# Patient Record
Sex: FEMALE | URBAN - METROPOLITAN AREA
[De-identification: names, ages, dates, MRNs, and addresses within clinical notes are randomized per-mention and may not be internally consistent; named-entity substitution may affect disease eponyms.]

---

## 2017-04-12 ENCOUNTER — APPOINTMENT (OUTPATIENT)
Age: 52
Setting detail: DERMATOLOGY
End: 2017-04-12

## 2017-04-12 DIAGNOSIS — Z41.9 ENCOUNTER FOR PROCEDURE FOR PURPOSES OTHER THAN REMEDYING HEALTH STATE, UNSPECIFIED: ICD-10-CM

## 2017-04-12 PROCEDURE — ? CHEMICAL PEEL

## 2017-04-12 ASSESSMENT — LOCATION SIMPLE DESCRIPTION DERM: LOCATION SIMPLE: LEFT CHEEK

## 2017-04-12 ASSESSMENT — LOCATION ZONE DERM: LOCATION ZONE: FACE

## 2017-04-12 ASSESSMENT — LOCATION DETAILED DESCRIPTION DERM: LOCATION DETAILED: LEFT CENTRAL MALAR CHEEK

## 2017-04-12 NOTE — PROCEDURE: CHEMICAL PEEL
Price (Use Numbers Only, No Special Characters Or $): 225.00
Prep: Pre-peel cleanser was applied to the treatment area.
Chemical Peel: Skin Medica Rejuvenize
Consent: Prior to the procedure, verbal consent was obtained and risks were reviewed, including but not limited to: redness, peeling, irritation.
Post Peel Care: Cleanse with mild wash, moisturizer, as needed and use spf 30 or higher. Patient was instructed to avoid use of acne medications for two days after treatment, and for four days prior to any future chemical peel service.
Detail Level: Zone
Post-Care Instructions: I reviewed with the patient in detail post-care instructions. Patient should avoid sun exposure and wear sun protection.

## 2017-04-24 ENCOUNTER — APPOINTMENT (OUTPATIENT)
Age: 52
Setting detail: DERMATOLOGY
End: 2017-04-24

## 2017-04-24 DIAGNOSIS — Z41.9 ENCOUNTER FOR PROCEDURE FOR PURPOSES OTHER THAN REMEDYING HEALTH STATE, UNSPECIFIED: ICD-10-CM

## 2017-04-24 PROCEDURE — ? COSMETIC FOLLOW-UP

## 2017-04-24 NOTE — PROCEDURE: COSMETIC FOLLOW-UP
Improvement Override (Free Text): None
Patient Satisfaction: Very dissatisfied
Side Effects Or Complications: Under correction
Detail Level: Zone

## 2017-04-26 ENCOUNTER — APPOINTMENT (OUTPATIENT)
Age: 52
Setting detail: DERMATOLOGY
End: 2017-04-26

## 2017-04-26 DIAGNOSIS — Z41.9 ENCOUNTER FOR PROCEDURE FOR PURPOSES OTHER THAN REMEDYING HEALTH STATE, UNSPECIFIED: ICD-10-CM

## 2017-04-26 PROCEDURE — ? CHEMICAL PEEL

## 2017-04-26 ASSESSMENT — LOCATION DETAILED DESCRIPTION DERM: LOCATION DETAILED: LEFT INFERIOR MEDIAL MALAR CHEEK

## 2017-04-26 ASSESSMENT — LOCATION ZONE DERM: LOCATION ZONE: FACE

## 2017-04-26 ASSESSMENT — LOCATION SIMPLE DESCRIPTION DERM: LOCATION SIMPLE: LEFT CHEEK

## 2017-04-26 NOTE — PROCEDURE: CHEMICAL PEEL
Price (Use Numbers Only, No Special Characters Or $): 0.00
Prep: Pre-peel cleanser was applied to the treatment area.
Post-Care Instructions: I reviewed with the patient in detail post-care instructions. Patient should avoid sun exposure and wear sun protection.
Post Peel Care: Cleanse with mild cleanser in am.
Detail Level: Zone
Chemical Peel: Skin Medica Vitalize
Consent: Prior to the procedure, verbal consent was obtained and risks were reviewed, including but not limited to: redness, peeling, irritation.

## 2017-06-06 ENCOUNTER — APPOINTMENT (OUTPATIENT)
Age: 52
Setting detail: DERMATOLOGY
End: 2017-06-06

## 2017-06-06 DIAGNOSIS — L70.0 ACNE VULGARIS: ICD-10-CM

## 2017-06-06 PROCEDURE — 10040 EXTRACTION: CPT

## 2017-06-06 PROCEDURE — ? ACNE SURGERY

## 2017-06-06 ASSESSMENT — LOCATION SIMPLE DESCRIPTION DERM: LOCATION SIMPLE: LEFT CHEEK

## 2017-06-06 ASSESSMENT — LOCATION ZONE DERM: LOCATION ZONE: FACE

## 2017-06-06 ASSESSMENT — LOCATION DETAILED DESCRIPTION DERM: LOCATION DETAILED: LEFT INFERIOR MEDIAL MALAR CHEEK

## 2017-06-06 NOTE — PROCEDURE: ACNE SURGERY
Extraction Method: comedo extractor
Prep Text (Optional): Prior to removal the treatment areas were prepped in the usual fashion.
Consent was obtained and risks were reviewed.
Render Number Of Lesions Treated: no
Post-Care Instructions: I reviewed with the patient  post-care instructions.
Detail Level: Zone
Acne Type: Comedonal Lesions
Render Post-Care Instructions In Note?: yes

## 2017-06-20 ENCOUNTER — APPOINTMENT (OUTPATIENT)
Age: 52
Setting detail: DERMATOLOGY
End: 2017-06-20

## 2017-06-20 DIAGNOSIS — L70.0 ACNE VULGARIS: ICD-10-CM

## 2017-06-20 PROCEDURE — 10040 EXTRACTION: CPT

## 2017-06-20 PROCEDURE — ? ACNE SURGERY

## 2017-06-20 ASSESSMENT — LOCATION ZONE DERM: LOCATION ZONE: FACE

## 2017-06-20 ASSESSMENT — LOCATION DETAILED DESCRIPTION DERM: LOCATION DETAILED: LEFT INFERIOR CENTRAL MALAR CHEEK

## 2017-06-20 ASSESSMENT — LOCATION SIMPLE DESCRIPTION DERM: LOCATION SIMPLE: LEFT CHEEK

## 2017-06-20 NOTE — PROCEDURE: ACNE SURGERY
Acne Type: Comedonal Lesions
Detail Level: Zone
Consent was obtained and risks were reviewed.
Post-Care Instructions: I reviewed with the patient  post-care instructions.
Render Number Of Lesions Treated: no
Prep Text (Optional): Prior to removal the treatment areas were prepped in the usual fashion.
Render Post-Care Instructions In Note?: yes
Extraction Method: comedo extractor

## 2017-07-10 ENCOUNTER — APPOINTMENT (OUTPATIENT)
Age: 52
Setting detail: DERMATOLOGY
End: 2017-07-10

## 2017-07-10 DIAGNOSIS — L70.0 ACNE VULGARIS: ICD-10-CM

## 2017-07-10 PROCEDURE — ? ACNE SURGERY

## 2017-07-10 PROCEDURE — 10040 EXTRACTION: CPT

## 2017-07-10 ASSESSMENT — LOCATION DETAILED DESCRIPTION DERM: LOCATION DETAILED: LEFT INFERIOR CENTRAL MALAR CHEEK

## 2017-07-10 ASSESSMENT — LOCATION ZONE DERM: LOCATION ZONE: FACE

## 2017-07-10 ASSESSMENT — LOCATION SIMPLE DESCRIPTION DERM: LOCATION SIMPLE: LEFT CHEEK

## 2017-07-10 NOTE — PROCEDURE: ACNE SURGERY
Prep Text (Optional): Prior to removal the treatment areas were prepped in the usual fashion.
Consent was obtained and risks were reviewed.
Extraction Method: comedo extractor
Post-Care Instructions: I reviewed with the patient  post-care instructions.
Detail Level: Zone
Render Post-Care Instructions In Note?: yes
Render Number Of Lesions Treated: no
Acne Type: Comedonal Lesions

## 2017-07-24 ENCOUNTER — APPOINTMENT (OUTPATIENT)
Age: 52
Setting detail: DERMATOLOGY
End: 2017-07-24

## 2017-07-24 DIAGNOSIS — L70.0 ACNE VULGARIS: ICD-10-CM

## 2017-07-24 PROCEDURE — 10040 EXTRACTION: CPT

## 2017-07-24 PROCEDURE — ? ACNE SURGERY

## 2017-07-24 ASSESSMENT — LOCATION SIMPLE DESCRIPTION DERM: LOCATION SIMPLE: LEFT CHEEK

## 2017-07-24 ASSESSMENT — LOCATION DETAILED DESCRIPTION DERM: LOCATION DETAILED: LEFT INFERIOR MEDIAL MALAR CHEEK

## 2017-07-24 ASSESSMENT — LOCATION ZONE DERM: LOCATION ZONE: FACE

## 2017-07-24 NOTE — PROCEDURE: ACNE SURGERY
Acne Type: Comedonal Lesions
Consent was obtained and risks were reviewed.
Detail Level: Zone
Prep Text (Optional): Prior to removal the treatment areas were prepped in the usual fashion.
Render Number Of Lesions Treated: no
Post-Care Instructions: I reviewed with the patient  post-care instructions.
Render Post-Care Instructions In Note?: yes
Extraction Method: comedo extractor

## 2017-09-11 ENCOUNTER — APPOINTMENT (OUTPATIENT)
Age: 52
Setting detail: DERMATOLOGY
End: 2017-09-11

## 2017-09-11 DIAGNOSIS — L70.0 ACNE VULGARIS: ICD-10-CM

## 2017-09-11 PROCEDURE — ? ACNE SURGERY

## 2017-09-11 PROCEDURE — 10040 EXTRACTION: CPT

## 2017-09-11 ASSESSMENT — LOCATION SIMPLE DESCRIPTION DERM: LOCATION SIMPLE: LEFT CHEEK

## 2017-09-11 ASSESSMENT — LOCATION ZONE DERM: LOCATION ZONE: FACE

## 2017-09-11 ASSESSMENT — LOCATION DETAILED DESCRIPTION DERM: LOCATION DETAILED: LEFT INFERIOR MEDIAL MALAR CHEEK

## 2017-09-11 NOTE — PROCEDURE: ACNE SURGERY
Detail Level: Zone
Acne Type: Comedonal Lesions
Render Post-Care Instructions In Note?: yes
Render Number Of Lesions Treated: no
Post-Care Instructions: I reviewed with the patient  post-care instructions.
Prep Text (Optional): Prior to removal the treatment areas were prepped in the usual fashion.
Extraction Method: comedo extractor
Consent was obtained and risks were reviewed.

## 2017-10-31 ENCOUNTER — APPOINTMENT (OUTPATIENT)
Age: 52
Setting detail: DERMATOLOGY
End: 2017-10-31

## 2017-10-31 DIAGNOSIS — L70.0 ACNE VULGARIS: ICD-10-CM

## 2017-10-31 PROBLEM — E13.9 OTHER SPECIFIED DIABETES MELLITUS WITHOUT COMPLICATIONS: Status: ACTIVE | Noted: 2017-10-31

## 2017-10-31 PROCEDURE — ? ACNE SURGERY

## 2017-10-31 PROCEDURE — 10040 EXTRACTION: CPT

## 2017-10-31 ASSESSMENT — LOCATION DETAILED DESCRIPTION DERM: LOCATION DETAILED: LEFT INFERIOR CENTRAL MALAR CHEEK

## 2017-10-31 ASSESSMENT — LOCATION ZONE DERM: LOCATION ZONE: FACE

## 2017-10-31 ASSESSMENT — LOCATION SIMPLE DESCRIPTION DERM: LOCATION SIMPLE: LEFT CHEEK

## 2017-10-31 NOTE — PROCEDURE: ACNE SURGERY
Render Number Of Lesions Treated: no
Consent was obtained and risks were reviewed.
Detail Level: Zone
Post-Care Instructions: I reviewed with the patient  post-care instructions.
Extraction Method: comedo extractor
Acne Type: Comedonal Lesions
Prep Text (Optional): Prior to removal the treatment areas were prepped in the usual fashion.
Render Post-Care Instructions In Note?: yes

## 2017-11-13 ENCOUNTER — APPOINTMENT (OUTPATIENT)
Age: 52
Setting detail: DERMATOLOGY
End: 2017-11-13

## 2017-11-13 DIAGNOSIS — L70.0 ACNE VULGARIS: ICD-10-CM

## 2017-11-13 PROCEDURE — ? ACNE SURGERY

## 2017-11-13 PROCEDURE — 10040 EXTRACTION: CPT

## 2017-11-13 ASSESSMENT — LOCATION DETAILED DESCRIPTION DERM: LOCATION DETAILED: LEFT INFERIOR CENTRAL MALAR CHEEK

## 2017-11-13 ASSESSMENT — LOCATION ZONE DERM: LOCATION ZONE: FACE

## 2017-11-13 ASSESSMENT — LOCATION SIMPLE DESCRIPTION DERM: LOCATION SIMPLE: LEFT CHEEK

## 2017-11-13 NOTE — PROCEDURE: ACNE SURGERY
Extraction Method: comedo extractor
Consent was obtained and risks were reviewed.
Post-Care Instructions: I reviewed with the patient  post-care instructions.
Prep Text (Optional): Prior to removal the treatment areas were prepped in the usual fashion.
Acne Type: Comedonal Lesions
Render Number Of Lesions Treated: no
Render Post-Care Instructions In Note?: yes
Detail Level: Zone

## 2017-11-27 ENCOUNTER — APPOINTMENT (OUTPATIENT)
Age: 52
Setting detail: DERMATOLOGY
End: 2017-11-27

## 2017-11-27 DIAGNOSIS — L70.0 ACNE VULGARIS: ICD-10-CM

## 2017-11-27 PROCEDURE — ? ACNE SURGERY

## 2017-11-27 PROCEDURE — 10040 EXTRACTION: CPT

## 2017-11-27 ASSESSMENT — LOCATION SIMPLE DESCRIPTION DERM: LOCATION SIMPLE: LEFT CHEEK

## 2017-11-27 ASSESSMENT — LOCATION ZONE DERM: LOCATION ZONE: FACE

## 2017-11-27 ASSESSMENT — LOCATION DETAILED DESCRIPTION DERM: LOCATION DETAILED: LEFT INFERIOR CENTRAL MALAR CHEEK

## 2017-11-27 NOTE — PROCEDURE: ACNE SURGERY
Post-Care Instructions: I reviewed with the patient  post-care instructions.
Render Post-Care Instructions In Note?: yes
Render Number Of Lesions Treated: no
Acne Type: Comedonal Lesions
Prep Text (Optional): Prior to removal the treatment areas were prepped in the usual fashion.
Detail Level: Zone
Extraction Method: comedo extractor
Consent was obtained and risks were reviewed.

## 2017-12-12 ENCOUNTER — APPOINTMENT (OUTPATIENT)
Age: 52
Setting detail: DERMATOLOGY
End: 2017-12-12

## 2018-01-09 ENCOUNTER — APPOINTMENT (OUTPATIENT)
Age: 53
Setting detail: DERMATOLOGY
End: 2018-01-09

## 2018-01-09 DIAGNOSIS — L70.0 ACNE VULGARIS: ICD-10-CM

## 2018-01-09 PROCEDURE — ? ACNE SURGERY

## 2018-01-09 PROCEDURE — 10040 EXTRACTION: CPT

## 2018-01-09 ASSESSMENT — LOCATION SIMPLE DESCRIPTION DERM: LOCATION SIMPLE: LEFT CHEEK

## 2018-01-09 ASSESSMENT — LOCATION ZONE DERM: LOCATION ZONE: FACE

## 2018-01-09 ASSESSMENT — LOCATION DETAILED DESCRIPTION DERM: LOCATION DETAILED: LEFT INFERIOR MEDIAL MALAR CHEEK

## 2018-01-09 NOTE — PROCEDURE: ACNE SURGERY
Post-Care Instructions: I reviewed with the patient  post-care instructions.
Render Post-Care Instructions In Note?: yes
Consent was obtained and risks were reviewed.
Acne Type: Comedonal Lesions
Render Number Of Lesions Treated: no
Detail Level: Zone
Prep Text (Optional): Prior to removal the treatment areas were prepped in the usual fashion.
Extraction Method: comedo extractor

## 2018-02-13 ENCOUNTER — APPOINTMENT (OUTPATIENT)
Age: 53
Setting detail: DERMATOLOGY
End: 2018-02-13

## 2018-02-13 DIAGNOSIS — L259 CONTACT DERMATITIS AND OTHER ECZEMA, UNSPECIFIED CAUSE: ICD-10-CM

## 2018-02-13 DIAGNOSIS — D17 BENIGN LIPOMATOUS NEOPLASM: ICD-10-CM

## 2018-02-13 DIAGNOSIS — L72.8 OTHER FOLLICULAR CYSTS OF THE SKIN AND SUBCUTANEOUS TISSUE: ICD-10-CM

## 2018-02-13 DIAGNOSIS — L82.1 OTHER SEBORRHEIC KERATOSIS: ICD-10-CM

## 2018-02-13 DIAGNOSIS — D18.0 HEMANGIOMA: ICD-10-CM

## 2018-02-13 DIAGNOSIS — I83.9 ASYMPTOMATIC VARICOSE VEINS OF LOWER EXTREMITIES: ICD-10-CM

## 2018-02-13 DIAGNOSIS — L81.4 OTHER MELANIN HYPERPIGMENTATION: ICD-10-CM

## 2018-02-13 DIAGNOSIS — D22 MELANOCYTIC NEVI: ICD-10-CM

## 2018-02-13 DIAGNOSIS — L70.0 ACNE VULGARIS: ICD-10-CM

## 2018-02-13 DIAGNOSIS — L57.0 ACTINIC KERATOSIS: ICD-10-CM

## 2018-02-13 PROBLEM — I83.93 ASYMPTOMATIC VARICOSE VEINS OF BILATERAL LOWER EXTREMITIES: Status: ACTIVE | Noted: 2018-02-13

## 2018-02-13 PROBLEM — D18.01 HEMANGIOMA OF SKIN AND SUBCUTANEOUS TISSUE: Status: ACTIVE | Noted: 2018-02-13

## 2018-02-13 PROBLEM — L23.9 ALLERGIC CONTACT DERMATITIS, UNSPECIFIED CAUSE: Status: ACTIVE | Noted: 2018-02-13

## 2018-02-13 PROBLEM — F41.9 ANXIETY DISORDER, UNSPECIFIED: Status: ACTIVE | Noted: 2018-02-13

## 2018-02-13 PROBLEM — D22.5 MELANOCYTIC NEVI OF TRUNK: Status: ACTIVE | Noted: 2018-02-13

## 2018-02-13 PROBLEM — D17.1 BENIGN LIPOMATOUS NEOPLASM OF SKIN AND SUBCUTANEOUS TISSUE OF TRUNK: Status: ACTIVE | Noted: 2018-02-13

## 2018-02-13 PROCEDURE — 17000 DESTRUCT PREMALG LESION: CPT

## 2018-02-13 PROCEDURE — ? ADDITIONAL NOTES

## 2018-02-13 PROCEDURE — 99214 OFFICE O/P EST MOD 30 MIN: CPT | Mod: 25

## 2018-02-13 PROCEDURE — ? PRESCRIPTION

## 2018-02-13 PROCEDURE — ? LIQUID NITROGEN

## 2018-02-13 PROCEDURE — ? OBSERVATION AND MEASURE

## 2018-02-13 PROCEDURE — ? COUNSELING

## 2018-02-13 RX ORDER — DESONIDE 0.5 MG/G
OINTMENT TOPICAL
Qty: 1 | Refills: 0 | Status: ERX | COMMUNITY
Start: 2018-02-13

## 2018-02-13 RX ADMIN — DESONIDE: 0.5 OINTMENT TOPICAL at 21:44

## 2018-02-13 ASSESSMENT — LOCATION DETAILED DESCRIPTION DERM
LOCATION DETAILED: RIGHT LATERAL SUPERIOR CHEST
LOCATION DETAILED: RIGHT MEDIAL SUPERIOR EYELID
LOCATION DETAILED: RIGHT SUPERIOR UPPER BACK
LOCATION DETAILED: RIGHT DISTAL PRETIBIAL REGION
LOCATION DETAILED: LEFT INFERIOR CENTRAL MALAR CHEEK
LOCATION DETAILED: LEFT SUPERIOR MEDIAL MIDBACK
LOCATION DETAILED: LEFT DISTAL PRETIBIAL REGION
LOCATION DETAILED: RIGHT RIB CAGE
LOCATION DETAILED: NASAL ROOT
LOCATION DETAILED: LEFT LATERAL SUPERIOR EYELID
LOCATION DETAILED: RIGHT MID-UPPER BACK

## 2018-02-13 ASSESSMENT — LOCATION SIMPLE DESCRIPTION DERM
LOCATION SIMPLE: LEFT SUPERIOR EYELID
LOCATION SIMPLE: NOSE
LOCATION SIMPLE: LEFT PRETIBIAL REGION
LOCATION SIMPLE: LEFT LOWER BACK
LOCATION SIMPLE: ABDOMEN
LOCATION SIMPLE: RIGHT PRETIBIAL REGION
LOCATION SIMPLE: RIGHT UPPER BACK
LOCATION SIMPLE: LEFT CHEEK
LOCATION SIMPLE: CHEST
LOCATION SIMPLE: RIGHT SUPERIOR EYELID

## 2018-02-13 ASSESSMENT — LOCATION ZONE DERM
LOCATION ZONE: LEG
LOCATION ZONE: EYELID
LOCATION ZONE: FACE
LOCATION ZONE: NOSE
LOCATION ZONE: TRUNK

## 2018-02-13 NOTE — PROCEDURE: LIQUID NITROGEN
Post-Care Instructions: I reviewed with the patient in detail post-care instructions. Patient is to wear sunprotection, and avoid picking at any of the treated lesions. Pt may apply Vaseline to crusted or scabbing areas.
Number Of Freeze-Thaw Cycles: 1 freeze-thaw cycle
Duration Of Freeze Thaw-Cycle (Seconds): 0
Render Post-Care Instructions In Note?: yes
Detail Level: Detailed
Consent: The patient's consent was obtained including but not limited to risks of crusting, scabbing, blistering, scarring, darker or lighter pigmentary change, recurrence, incomplete removal and infection.

## 2018-02-13 NOTE — PROCEDURE: COUNSELING
Doxycycline Counseling:  Patient counseled regarding possible photosensitivity and increased risk for sunburn.  Patient instructed to avoid sunlight, if possible.  When exposed to sunlight, patients should wear protective clothing, sunglasses, and sunscreen.  The patient was instructed to call the office immediately if the following severe adverse effects occur:  hearing changes, easy bruising/bleeding, severe headache, or vision changes.  The patient verbalized understanding of the proper use and possible adverse effects of doxycycline.  All of the patient's questions and concerns were addressed.
Detail Level: Generalized
Topical Clindamycin Counseling: Patient counseled that this medication may cause skin irritation or allergic reactions.  In the event of skin irritation, the patient was advised to reduce the amount of the drug applied or use it less frequently.   The patient verbalized understanding of the proper use and possible adverse effects of clindamycin.  All of the patient's questions and concerns were addressed.
Azithromycin Counseling:  I discussed with the patient the risks of azithromycin including but not limited to GI upset, allergic reaction, drug rash, diarrhea, and yeast infections.
Azithromycin Pregnancy And Lactation Text: This medication is considered safe during pregnancy and is also secreted in breast milk.
Topical Sulfur Applications Pregnancy And Lactation Text: This medication is Pregnancy Category C and has an unknown safety profile during pregnancy. It is unknown if this topical medication is excreted in breast milk.
Birth Control Pills Pregnancy And Lactation Text: This medication should be avoided if pregnant and for the first 30 days post-partum.
Detail Level: Simple
Tetracycline Counseling: Patient counseled regarding possible photosensitivity and increased risk for sunburn.  Patient instructed to avoid sunlight, if possible.  When exposed to sunlight, patients should wear protective clothing, sunglasses, and sunscreen.  The patient was instructed to call the office immediately if the following severe adverse effects occur:  hearing changes, easy bruising/bleeding, severe headache, or vision changes.  The patient verbalized understanding of the proper use and possible adverse effects of tetracycline.  All of the patient's questions and concerns were addressed. Patient understands to avoid pregnancy while on therapy due to potential birth defects.
Birth Control Pills Counseling: Birth Control Pill Counseling: I discussed with the patient the potential side effects of OCPs including but not limited to increased risk of stroke, heart attack, thrombophlebitis, deep venous thrombosis, hepatic adenomas, breast changes, GI upset, headaches, and depression.  The patient verbalized understanding of the proper use and possible adverse effects of OCPs. All of the patient's questions and concerns were addressed.
Detail Level: Zone
Tazorac Counseling:  Patient advised that medication is irritating and drying.  Patient may need to apply sparingly and wash off after an hour before eventually leaving it on overnight.  The patient verbalized understanding of the proper use and possible adverse effects of tazorac.  All of the patient's questions and concerns were addressed.
Topical Retinoid counseling:  Patient advised to apply a pea-sized amount only at bedtime and wait 30 minutes after washing their face before applying.  If too drying, patient may add a non-comedogenic moisturizer. The patient verbalized understanding of the proper use and possible adverse effects of retinoids.  All of the patient's questions and concerns were addressed.
Spironolactone Counseling: Patient advised regarding risks of diarrhea, abdominal pain, hyperkalemia, birth defects (for female patients), liver toxicity and renal toxicity. The patient may need blood work to monitor liver and kidney function and potassium levels while on therapy. The patient verbalized understanding of the proper use and possible adverse effects of spironolactone.  All of the patient's questions and concerns were addressed.
Isotretinoin Pregnancy And Lactation Text: This medication is Pregnancy Category X and is considered extremely dangerous during pregnancy. It is unknown if it is excreted in breast milk.
Isotretinoin Counseling: Patient should get monthly blood tests, not donate blood, not drive at night if vision affected, not share medication, and not undergo elective surgery for 6 months after tx completed. Side effects reviewed, pt to contact office should one occur.
Tetracycline Pregnancy And Lactation Text: This medication is Pregnancy Category D and not consider safe during pregnancy. It is also excreted in breast milk.
Topical Retinoid Pregnancy And Lactation Text: This medication is Pregnancy Category C. It is unknown if this medication is excreted in breast milk.
Include Pregnancy/Lactation Warning?: No
Bactrim Pregnancy And Lactation Text: This medication is Pregnancy Category D and is known to cause fetal risk.  It is also excreted in breast milk.
Erythromycin Counseling:  I discussed with the patient the risks of erythromycin including but not limited to GI upset, allergic reaction, drug rash, diarrhea, increase in liver enzymes, and yeast infections.
Benzoyl Peroxide Counseling: Patient counseled that medicine may cause skin irritation and bleach clothing.  In the event of skin irritation, the patient was advised to reduce the amount of the drug applied or use it less frequently.   The patient verbalized understanding of the proper use and possible adverse effects of benzoyl peroxide.  All of the patient's questions and concerns were addressed.
Dapsone Counseling: I discussed with the patient the risks of dapsone including but not limited to hemolytic anemia, agranulocytosis, rashes, methemoglobinemia, kidney failure, peripheral neuropathy, headaches, GI upset, and liver toxicity.  Patients who start dapsone require monitoring including baseline LFTs and weekly CBCs for the first month, then every month thereafter.  The patient verbalized understanding of the proper use and possible adverse effects of dapsone.  All of the patient's questions and concerns were addressed.
Minocycline Counseling: Patient advised regarding possible photosensitivity and discoloration of the teeth, skin, lips, tongue and gums.  Patient instructed to avoid sunlight, if possible.  When exposed to sunlight, patients should wear protective clothing, sunglasses, and sunscreen.  The patient was instructed to call the office immediately if the following severe adverse effects occur:  hearing changes, easy bruising/bleeding, severe headache, or vision changes.  The patient verbalized understanding of the proper use and possible adverse effects of minocycline.  All of the patient's questions and concerns were addressed.
Spironolactone Pregnancy And Lactation Text: This medication can cause feminization of the male fetus and should be avoided during pregnancy. The active metabolite is also found in breast milk.
Tazorac Pregnancy And Lactation Text: This medication is not safe during pregnancy. It is unknown if this medication is excreted in breast milk.
High Dose Vitamin A Pregnancy And Lactation Text: High dose vitamin A therapy is contraindicated during pregnancy and breast feeding.
Topical Sulfur Applications Counseling: Topical Sulfur Counseling: Patient counseled that this medication may cause skin irritation or allergic reactions.  In the event of skin irritation, the patient was advised to reduce the amount of the drug applied or use it less frequently.   The patient verbalized understanding of the proper use and possible adverse effects of topical sulfur application.  All of the patient's questions and concerns were addressed.
Erythromycin Pregnancy And Lactation Text: This medication is Pregnancy Category B and is considered safe during pregnancy. It is also excreted in breast milk.
Detail Level: Detailed
Topical Clindamycin Pregnancy And Lactation Text: This medication is Pregnancy Category B and is considered safe during pregnancy. It is unknown if it is excreted in breast milk.
Dapsone Pregnancy And Lactation Text: This medication is Pregnancy Category C and is not considered safe during pregnancy or breast feeding.
Benzoyl Peroxide Pregnancy And Lactation Text: This medication is Pregnancy Category C. It is unknown if benzoyl peroxide is excreted in breast milk.
Doxycycline Pregnancy And Lactation Text: This medication is Pregnancy Category D and not consider safe during pregnancy. It is also excreted in breast milk but is considered safe for shorter treatment courses.
Bactrim Counseling:  I discussed with the patient the risks of sulfa antibiotics including but not limited to GI upset, allergic reaction, drug rash, diarrhea, dizziness, photosensitivity, and yeast infections.  Rarely, more serious reactions can occur including but not limited to aplastic anemia, agranulocytosis, methemoglobinemia, blood dyscrasias, liver or kidney failure, lung infiltrates or desquamative/blistering drug rashes.
High Dose Vitamin A Counseling: Side effects reviewed, pt to contact office should one occur.

## 2018-02-13 NOTE — PROCEDURE: ADDITIONAL NOTES
Additional Notes: - Will start Desonide Ointment once to twice daily for 5 days\\n- Patient does have/wear acrylic nails and nail polish \\n- If not better in a few months will consider patch testing
Additional Notes: Can schedule appointment with Kym Gant for Sclerotherapy

## 2018-02-19 ENCOUNTER — APPOINTMENT (OUTPATIENT)
Age: 53
Setting detail: DERMATOLOGY
End: 2018-02-19

## 2018-02-19 DIAGNOSIS — L70.0 ACNE VULGARIS: ICD-10-CM

## 2018-02-19 PROCEDURE — ? ACNE SURGERY

## 2018-02-19 PROCEDURE — 10040 EXTRACTION: CPT | Mod: 79

## 2018-02-19 ASSESSMENT — LOCATION SIMPLE DESCRIPTION DERM: LOCATION SIMPLE: LEFT CHEEK

## 2018-02-19 ASSESSMENT — LOCATION DETAILED DESCRIPTION DERM: LOCATION DETAILED: LEFT INFERIOR MEDIAL MALAR CHEEK

## 2018-02-19 ASSESSMENT — LOCATION ZONE DERM: LOCATION ZONE: FACE

## 2018-02-19 NOTE — PROCEDURE: ACNE SURGERY
Render Number Of Lesions Treated: no
Detail Level: Zone
Post-Care Instructions: I reviewed with the patient  post-care instructions.
Render Post-Care Instructions In Note?: yes
Prep Text (Optional): Prior to removal the treatment areas were prepped in the usual fashion.
Consent was obtained and risks were reviewed.
Extraction Method: comedo extractor
Acne Type: Comedonal Lesions

## 2018-03-27 ENCOUNTER — APPOINTMENT (OUTPATIENT)
Age: 53
Setting detail: DERMATOLOGY
End: 2018-03-27

## 2018-03-27 DIAGNOSIS — L259 CONTACT DERMATITIS AND OTHER ECZEMA, UNSPECIFIED CAUSE: ICD-10-CM

## 2018-03-27 PROBLEM — L23.9 ALLERGIC CONTACT DERMATITIS, UNSPECIFIED CAUSE: Status: ACTIVE | Noted: 2018-03-27

## 2018-03-27 PROCEDURE — ? ADDITIONAL NOTES

## 2018-03-27 PROCEDURE — ? OTHER

## 2018-03-27 PROCEDURE — ? COUNSELING

## 2018-03-27 PROCEDURE — 99213 OFFICE O/P EST LOW 20 MIN: CPT

## 2018-03-27 ASSESSMENT — LOCATION ZONE DERM: LOCATION ZONE: EYELID

## 2018-03-27 ASSESSMENT — LOCATION DETAILED DESCRIPTION DERM
LOCATION DETAILED: LEFT LATERAL SUPERIOR EYELID
LOCATION DETAILED: RIGHT MEDIAL SUPERIOR EYELID

## 2018-03-27 ASSESSMENT — LOCATION SIMPLE DESCRIPTION DERM
LOCATION SIMPLE: LEFT SUPERIOR EYELID
LOCATION SIMPLE: RIGHT SUPERIOR EYELID

## 2018-03-27 NOTE — PROCEDURE: ADDITIONAL NOTES
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

## 2018-03-27 NOTE — PROCEDURE: OTHER
Detail Level: Zone
Other (Free Text): - PLAN ROBERT (84), COS (56), FRAG(46) (TOTAL 186)\\n\\n- Her children has history of seasonal allergies.\\n- Her mother has history of allergies, but not her father.\\n- Patient has no history of eczema or allergies.\\n- Discussed in detail patch testing procedures.\\n- Forms given to patient and patient filled out and returned all required forms prior to Patch testing appointment.\\n- Advised not to get back (were patches will be applied) wet for 5 days (the entire process).\\n- Advised can take sponge bathes to other areas of body but again DO NOT GET BACK WET.\\n- Advised NOT to take oral Antihistamines 1 week prior to Patch testing.\\n- Advised NOT to take oral steroids 1 week prior to Patch testing.
Note Text (......Xxx Chief Complaint.): This diagnosis correlates with the

## 2018-04-23 ENCOUNTER — APPOINTMENT (OUTPATIENT)
Age: 53
Setting detail: DERMATOLOGY
End: 2018-04-23

## 2018-04-23 DIAGNOSIS — L70.0 ACNE VULGARIS: ICD-10-CM

## 2018-04-23 PROCEDURE — ? ACNE SURGERY

## 2018-04-23 PROCEDURE — 10040 EXTRACTION: CPT

## 2018-04-23 ASSESSMENT — LOCATION SIMPLE DESCRIPTION DERM: LOCATION SIMPLE: LEFT CHEEK

## 2018-04-23 ASSESSMENT — LOCATION ZONE DERM: LOCATION ZONE: FACE

## 2018-04-23 ASSESSMENT — LOCATION DETAILED DESCRIPTION DERM: LOCATION DETAILED: LEFT INFERIOR CENTRAL MALAR CHEEK

## 2018-05-14 ENCOUNTER — APPOINTMENT (OUTPATIENT)
Age: 53
Setting detail: DERMATOLOGY
End: 2018-05-14

## 2018-05-14 DIAGNOSIS — L70.0 ACNE VULGARIS: ICD-10-CM

## 2018-05-14 PROCEDURE — 10040 EXTRACTION: CPT

## 2018-05-14 PROCEDURE — ? ACNE SURGERY

## 2018-05-14 ASSESSMENT — LOCATION DETAILED DESCRIPTION DERM: LOCATION DETAILED: LEFT INFERIOR CENTRAL MALAR CHEEK

## 2018-05-14 ASSESSMENT — LOCATION SIMPLE DESCRIPTION DERM: LOCATION SIMPLE: LEFT CHEEK

## 2018-05-14 ASSESSMENT — LOCATION ZONE DERM: LOCATION ZONE: FACE

## 2018-05-14 NOTE — PROCEDURE: ACNE SURGERY
Acne Type: Comedonal Lesions
Post-Care Instructions: I reviewed with the patient  post-care instructions.
Prep Text (Optional): Prior to removal the treatment areas were prepped in the usual fashion.
Render Post-Care Instructions In Note?: no
Detail Level: Zone
Consent was obtained and risks were reviewed.
Extraction Method: comedo extractor

## 2018-05-29 ENCOUNTER — APPOINTMENT (OUTPATIENT)
Age: 53
Setting detail: DERMATOLOGY
End: 2018-05-29

## 2018-05-29 DIAGNOSIS — L70.0 ACNE VULGARIS: ICD-10-CM

## 2018-05-29 DIAGNOSIS — Z41.9 ENCOUNTER FOR PROCEDURE FOR PURPOSES OTHER THAN REMEDYING HEALTH STATE, UNSPECIFIED: ICD-10-CM

## 2018-05-29 PROCEDURE — ? ACNE SURGERY

## 2018-05-29 PROCEDURE — ? MICRODERMABRASION

## 2018-05-29 PROCEDURE — 10040 EXTRACTION: CPT

## 2018-05-29 ASSESSMENT — LOCATION SIMPLE DESCRIPTION DERM
LOCATION SIMPLE: LEFT CHEEK
LOCATION SIMPLE: LEFT CHEEK

## 2018-05-29 ASSESSMENT — LOCATION DETAILED DESCRIPTION DERM
LOCATION DETAILED: LEFT INFERIOR CENTRAL MALAR CHEEK
LOCATION DETAILED: LEFT INFERIOR CENTRAL MALAR CHEEK

## 2018-05-29 ASSESSMENT — LOCATION ZONE DERM
LOCATION ZONE: FACE
LOCATION ZONE: FACE

## 2018-05-29 NOTE — PROCEDURE: ACNE SURGERY
Extraction Method: comedo extractor
Acne Type: Cysts
Detail Level: Zone
Consent was obtained and risks were reviewed.
Prep Text (Optional): Prior to removal the treatment areas were prepped in the usual fashion.
Render Number Of Lesions Treated: no
Post-Care Instructions: I reviewed with the patient  post-care instructions.

## 2018-05-29 NOTE — PROCEDURE: MICRODERMABRASION
Consent: Verbal consent obtained.
Detail Level: Zone
Post-Care Instructions: I reviewed with the patient in detail post-care instructions. Patient should stay away from the sun and wear sun protection until treated areas are fully healed.
Prep Text: The patients skin was cleaned and prepped. Patient is paying out of pocket for the service.
Endpoint: mild erythema
Indication: acne
Price (Use Numbers Only, No Special Characters Or $): 35.00
Number Of Passes: 1
Vacuum Pressure: 10
Wand: Medium

## 2018-07-02 ENCOUNTER — APPOINTMENT (OUTPATIENT)
Age: 53
Setting detail: DERMATOLOGY
End: 2018-07-02

## 2018-07-02 DIAGNOSIS — Z41.9 ENCOUNTER FOR PROCEDURE FOR PURPOSES OTHER THAN REMEDYING HEALTH STATE, UNSPECIFIED: ICD-10-CM

## 2018-07-02 DIAGNOSIS — L70.0 ACNE VULGARIS: ICD-10-CM

## 2018-07-02 PROCEDURE — ? ACNE SURGERY

## 2018-07-02 PROCEDURE — ? MICRODERMABRASION

## 2018-07-02 PROCEDURE — 10040 EXTRACTION: CPT

## 2018-07-02 ASSESSMENT — LOCATION ZONE DERM
LOCATION ZONE: FACE
LOCATION ZONE: FACE

## 2018-07-02 ASSESSMENT — LOCATION SIMPLE DESCRIPTION DERM
LOCATION SIMPLE: LEFT CHEEK
LOCATION SIMPLE: LEFT CHEEK

## 2018-07-02 NOTE — PROCEDURE: ACNE SURGERY
Consent was obtained and risks were reviewed.
Prep Text (Optional): Prior to removal the treatment areas were prepped in the usual fashion.
Render Number Of Lesions Treated: no
Detail Level: Zone
Post-Care Instructions: I reviewed with the patient  post-care instructions.
Extraction Method: comedo extractor
Acne Type: Comedonal Lesions

## 2018-07-02 NOTE — PROCEDURE: MICRODERMABRASION
Prep Text: The patients skin was cleaned and prepped. Patient is paying out of pocket for the service.
Number Of Passes: 1
Price (Use Numbers Only, No Special Characters Or $): 35.00
Indication: pigmentation abnormalities
Endpoint: mild erythema
Consent: Verbal consent obtained.
Vacuum Pressure: 10
Post-Care Instructions: I reviewed with the patient in detail post-care instructions. Patient should stay away from the sun and wear sun protection until treated areas are fully healed.
Detail Level: Zone
Wand: Medium

## 2018-07-30 ENCOUNTER — APPOINTMENT (OUTPATIENT)
Age: 53
Setting detail: DERMATOLOGY
End: 2018-07-30

## 2018-07-30 DIAGNOSIS — L259 CONTACT DERMATITIS AND OTHER ECZEMA, UNSPECIFIED CAUSE: ICD-10-CM

## 2018-07-30 PROBLEM — L23.9 ALLERGIC CONTACT DERMATITIS, UNSPECIFIED CAUSE: Status: ACTIVE | Noted: 2018-07-30

## 2018-07-30 PROCEDURE — ? ADDITIONAL NOTES

## 2018-07-30 PROCEDURE — 95044 PATCH/APPLICATION TESTS: CPT

## 2018-07-30 PROCEDURE — ? PATCH TESTING

## 2018-07-30 NOTE — HPI: TESTING (PATCH TESTING)
Has Your Rash Been Biopsied Before?: has not been biopsied previously
Have You Had Previous Patch Testing In The Past?: none
How Severe Is Your Rash At Its Worst?: moderate
Who Is Your Referring Doctor?: 
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

## 2018-07-30 NOTE — PROCEDURE: ADDITIONAL NOTES
Additional Notes: Patient informed to keep back dry, no antihistamines, no sports bras, no exercising, and no excessive sweating. Patient to do sponge baths.

## 2018-07-30 NOTE — PROCEDURE: PATCH TESTING
Number Of Patches (Maximum Allowable Per Dos By Cms Is 90): 84
Consent: Written consent obtained, risks reviewed including but not limited to rash, itching, allergic reaction, systemic rash, remote possiblity of anaphylaxis to allergen.
Post-Care Instructions: I reviewed with the patient in detail post-care instructions. Patient should not sweat, pick at, or get the patches wet for 48 hours.
Detail Level: Zone
Number Of Patches (Maximum Allowable Per Dos By Cms Is 90): 57
Number Of Patches (Maximum Allowable Per Dos By Cms Is 90): 46

## 2018-08-01 ENCOUNTER — APPOINTMENT (OUTPATIENT)
Age: 53
Setting detail: DERMATOLOGY
End: 2018-08-01

## 2018-08-01 DIAGNOSIS — L259 CONTACT DERMATITIS AND OTHER ECZEMA, UNSPECIFIED CAUSE: ICD-10-CM

## 2018-08-01 PROBLEM — L23.9 ALLERGIC CONTACT DERMATITIS, UNSPECIFIED CAUSE: Status: ACTIVE | Noted: 2018-08-01

## 2018-08-01 PROCEDURE — ? COSMETIC PATCH TEST READING

## 2018-08-01 PROCEDURE — ? FRAGRANCE PATCH TEST READING

## 2018-08-01 PROCEDURE — ? NORTH AMERICAN 80 PATCH TEST READING

## 2018-08-01 PROCEDURE — 99213 OFFICE O/P EST LOW 20 MIN: CPT

## 2018-08-01 NOTE — PROCEDURE: FRAGRANCE PATCH TEST READING
Allergen 7 Reaction: no reaction
Name Of Allergen 1: Cinnamal / (Cinnamic aldehyde)
Name Of Allergen 8: Hydroxycitronellal
Name Of Allergen 25: Hydroxyisohexyl 3-Cyclohexene Carboxaldehyde (Lyral)
Name Of Allergen 3: Amyl cinnamal / (Amylcinnamaldehyde)
What Reading Time Point?: 48 hour
Name Of Allergen 37: Tomas armstronga
Name Of Allergen 24: Santalum album oil / (Sandalwood oil (Dominican)
Name Of Allergen 26: Citral
Detail Level: Zone
Name Of Allergen 19: Cananga oil
Name Of Allergen 23: Dayan absolute
Show Negative Results In The Note?: Yes
Name Of Allergen 5: Isoeugenol
Name Of Allergen 33: Anise alcohol
Name Of Allergen 40: Linalool, synthetic
Name Of Allergen 2: Cinnamyl alcohol / (Cinnamic alcohol)
Name Of Allergen 39: D-Limonene
Name Of Allergen 34: Benzyl benzoate
Name Of Allergen 11: Methylanthranilate
Name Of Allergen 42: Maj[place]hole/ (2,2-dimethyl-3-(3-pethylphenyl)propan1-ol)
Name Of Allergen 31: Fragrance Mix II
Name Of Allergen 32: Amyl cinnamyl alcohol
Name Of Allergen 10: Musk xylene
Name Of Allergen 43: Hydroperoxides of Linalool
Name Of Allergen 12: Musk moskene
Name Of Allergen 28: Citronellol
Name Of Allergen 17: Vanillin
Name Of Allergen 20: Diana oil
Name Of Allergen 30: Coumarin
Name Of Allergen 14: Dayan synthetic
Name Of Allergen 44: Hydroperoxides of Limonene
Name Of Allergen 36: Butylphenyl methylpropional (Teri)
Name Of Allergen 45: Perfume mix
Name Of Allergen 6: Geraniol
Name Of Allergen 35: Benzyl cinnamate
Name Of Allergen 22: Geranium oil Elizabeth
Name Of Allergen 9: Narcissus Poeticus / (Narcissus absolute)
Name Of Allergen 4: Eugenol
Name Of Allergen 41: Methyl-2-octynoate
Name Of Allergen 38: alpha-Isomethyl ionone
Name Of Allergen 13: Musk ketone
Name Of Allergen 18: Lavender absolute/(Lavandula)
Name Of Allergen 7: Melania absolute
Number Of Patches Read: 46
Name Of Allergen 16: Benzyl alcohol
Name Of Allergen 15: Benzyl salicylate
Name Of Allergen 29: Hexyl cinnamic aldehyde
Show Allergen Counseling In The Note?: No
Name Of Allergen 21: Cananga odorata oil / (Ylang-Ylang oil)
Name Of Allergen 27: Farnesol/(2,6,10-Dodecatrien-1-ol,3,7,11 trimethyl)

## 2018-08-01 NOTE — PROCEDURE: COSMETIC PATCH TEST READING
Allergen 58 Reaction: no reaction
Name Of Allergen 55: Musk mix
Name Of Allergen 37: Drometrizole / (2(2-Hydroxy-5-methyl-phenyl)benzotriazol)
Name Of Allergen 21: Clioquinol (Chinoform, Vioform)
Name Of Allergen 33: Cocamidopropylbetaine
Name Of Allergen 36: tert-Butylhydroquinone
Name Of Allergen 53: Turpentine Oil Oxidized
Name Of Allergen 29: Cetyl alcohol
Name Of Allergen 42: Diazolidinylurea (Germall II)
Name Of Allergen 12: Chloroxylenol (PCMX) / (4-Chloro-3,5-xylenol (PCMX))
Name Of Allergen 40: Quaternium-15 (Dowicil 200) / (1-(3-Chloroallyl)-3,5,5-uybehg-2-azoniaadamantane chloride)
Name Of Allergen 31: 2-Bromo-2-nitropropane-l,3-diol (Bronopol)
Name Of Allergen 34: Benzyl alcohol
Name Of Allergen 30: Benzyl salicylate
Name Of Allergen 19: Chloroacetamide / (2-Chloroacetamide)
Name Of Allergen 6: 2-tert-Butyl-4-methoxyphenol (BHA)
Name Of Allergen 26: Sorbitan sesquioleate
Name Of Allergen 23: Hydroabietyl alcohol / (Abitol)
Name Of Allergen 7: BHT / (2,7-Gg-uyvc-butyl-4-cresol)
Name Of Allergen 46: Tea Tree Oil
Show Allergen Counseling In The Note?: Yes
Name Of Allergen 20: Hexahydro-1,3,5-darian-(2-hydroxyethyl)triazine(Clarisse CORTEZ)
Detail Level: Zone
Name Of Allergen 35: Methylisothiazolinone + Methylchloroisothiazolinone / (Cl+-Me-isothiazolinone (Su CG 200ppm))
Name Of Allergen 54: Methylisothiazolinone
Name Of Allergen 5: Sorbitan oleate / (Sorbitan monooleate (Span 80))
Name Of Allergen 14: Imidazolidinyl urea Germall 115
Name Of Allergen 15: Methenamine / (Hexamethylene tetramine)
Name Of Allergen 44: DMDM Hydantoin
Name Of Allergen 51: Shellac
Name Of Allergen 28: Stearyl alcohol
Number Of Patches Read: 57
Name Of Allergen 52: Tocopheryl acetate
Name Of Allergen 27: Propylene glycol
Name Of Allergen 9: Triclosan (Irgasan )
Name Of Allergen 16: Chlorhexidine digluconate
Name Of Allergen 18: Phenylmercuric acetate
Name Of Allergen 48: Dimethylaminopropylamine
Name Of Allergen 49: lauryl polyglucose
Name Of Allergen 41: 2-Phenoxyethanol
Name Of Allergen 1: Isopropyl myristate
Name Of Allergen 24: Phenyl salicylate(Salol)
Name Of Allergen 56: Oleamidopropyl dimethylamine
Name Of Allergen 45: Methyldibromo glutaronitrile (MDBGN)
Name Of Allergen 43: Tocopherol/ (DL alpha tocopherol)
Name Of Allergen 11: p-Chloro-m-cresol / (4-Chloro-3-cresol ([facility]))
Name Of Allergen 13: Thimerosal (Merthiolate)
Name Of Allergen 39: Dodecyl gallate
Name Of Allergen 17: Paraben Mix
Name Of Allergen 3: Triethanolamine
Name Of Allergen 25: Benzophenone-3 / (2-Hydroxy-4-methoxybenzophenone)
Name Of Allergen 4: Polysorbate 80 / (Polyoxyethylenesorbitanmonooleate(Tween 80))
Name Of Allergen 47: Iodopropynyl butyl carbamate
Name Of Allergen 38: Propyl gallate
Name Of Allergen 32: Sodium-2-pyridinethiol-1-oxide(Sodiumomadine)
Name Of Allergen 2: Amerchol L 101
Name Of Allergen 8: Octyl gallate
Name Of Allergen 50: Peppermint oil
Name Of Allergen 22: Ethylenediamine dihydrochloride
What Reading Time Point?: 48 hour
Name Of Allergen 10: Sorbic acid

## 2018-08-01 NOTE — PROCEDURE: NORTH AMERICAN 80 PATCH TEST READING
Allergen 98 Reaction: no reaction
Name Of Allergen 55: HEMA (2-Hydroxyethyl methacrylate)
Name Of Allergen 6: Cinnamal / (Cinnamic aldehyde)
Name Of Allergen 7: Amerchol L 101
Name Of Allergen 73: Ethylhexyl (Octyl Salicylate)
Name Of Allergen 62: Polysorbate 80 / Polyoxyethylenesorbitanmonooleate
Name Of Allergen 61: Desoximetasone
Name Of Allergen 30: 2-Bromo-2-nitropropane-l,3-diol (Bronopol)
Name Of Allergen 76: Cocamidopropylbetaine
Name Of Allergen 39: Ethyl acrylate
Show Allergen Counseling In The Note?: Yes
Name Of Allergen 49: Tea Tree Oil
Name Of Allergen 46: Cocamide DARSHAN / (Coconut diethanolamide)
Name Of Allergen 45: Budesonide
Name Of Allergen 33: Propolis
Name Of Allergen 54: Methylisothiazolinone
What Reading Time Point?: 48 hour
Name Of Allergen 74: Benzalkonium chloride
Number Of Patches Read: 84
Name Of Allergen 79: Propylene glycol
Name Of Allergen 56: DMDM Hydantoin
Name Of Allergen 47: Triethanolamine
Name Of Allergen 78: Methylisothiazolinone + Methylchloroisothiazolinone
Name Of Allergen 4: p-Phenylenediamine (PPD)
Name Of Allergen 71: Isoamyl-p-methoxycinnamate
Name Of Allergen 75: Amidoamine
Name Of Allergen 80: Dimethylol dihydroxyethyleneurea( Fix.CPN)
Name Of Allergen 48: Hydrocortisone-17-butyrate
Name Of Allergen 26: Paraben Mix
Name Of Allergen 65: Disperse Blue 106/124 Mix
Allergen 24 Reaction: irritant reaction
Name Of Allergen 16: Mercapto mix
Name Of Allergen 15: 3-saul-Ojnqntbifepronngcagdjsy resin
Name Of Allergen 24: Mixed dialkyl thiourea
Name Of Allergen 43: Cobalt(II) chloride hexahydrate
Name Of Allergen 29: Glutaral / (Glutaraldehyde)
Name Of Allergen 28: Fragrance mix
Name Of Allergen 11: Clobetasol-17-propionate
Name Of Allergen 10: Thiuram mix
Name Of Allergen 5: Imidazolidinyl urea Germall 115
Name Of Allergen 72: Hydroxyisohexyl 3-Cyclohexene Carboxaldehyde (Lyral)
Name Of Allergen 25: Disperse Orange 3
Name Of Allergen 82: EthylHexylGlycerin
Name Of Allergen 64: 2-n-Octyl-4-isothiazolin-3-one
Name Of Allergen 69: Dibucaine hydrochloride
Name Of Allergen 42: Methyl methacrylate
Name Of Allergen 37: 2-tert-Butyl-4-methoxyphenol (BHA)
Name Of Allergen 12: Ethylenediamine dihydrochloride
Name Of Allergen 41: Toluenesulfonamide formaldehyde resin
Name Of Allergen 21: Diazolidinylurea (Germall II)
Name Of Allergen 68: Fusidic acid sodium salt
Name Of Allergen 23: Bacitracin
Name Of Allergen 53: Decyl Glucoside
Name Of Allergen 14: Quaternium-15
Name Of Allergen 22: Tocopherol/ (DL alpha tocopherol)
Name Of Allergen 2: 2-Mercaptobenzothiazole (MBT)
Name Of Allergen 34: Benzophenone-3 / (2-Hydroxy-4-methoxybenzophenone)
Name Of Allergen 20: Nickelsulfate hexahydrate
Name Of Allergen 38: Goldsodiumthiosulfate
Name Of Allergen 67: Lidocaine
Name Of Allergen 81: EDTA (Disodium)
Name Of Allergen 50: Fragrance Mix II
Name Of Allergen 35: Chloroxylenol (PCMX) / (4-Chloro-3,5-xylenol (PCMX))
Name Of Allergen 32: Thimerosal (Merthiolate)
Name Of Allergen 40: Glyceryl monothioglycolate (GMTG)
Name Of Allergen 9: Neomycin sulfate
Detail Level: Zone
Name Of Allergen 18: Potassium dichromate
Name Of Allergen 70: Benzoylperoxide
Name Of Allergen 31: Sesquiterpene lactone mix
Name Of Allergen 8: Carba mix
Name Of Allergen 1: Benzocaine
Name Of Allergen 63: Iodopropynyl butyl carbamate
Name Of Allergen 77: Formaldehyde
Name Of Allergen 58: Benzyl alcohol
Name Of Allergen 17: N-Isopropyl-N-phenyl--4-phenylenediamine
Name Of Allergen 60: Triclosan (Irgasan )
Name Of Allergen 44: Tixocortol-21-pivalate
Name Of Allergen 36: Ethyleneurea, melamine formaldehyde mix
Name Of Allergen 19: Myroxylon pereirae resin / (Balsam [place])
Name Of Allergen 66: Compositae Mix II
Name Of Allergen 27: Methyldibromo glutaronitrile (MDBGN)
Name Of Allergen 84: Lanolin Alcohol
Name Of Allergen 52: Benzyl salicylate
Name Of Allergen 51: Disperse Yellow 3
Name Of Allergen 59: Isopropyl myristate
Name Of Allergen 83: Sodium Lauryl Sulfate
Name Of Allergen 3: Colophonium / (Colophony)
Name Of Allergen 57: Cananga odorata oil / (Ylang-Ylang oil)
Name Of Allergen 13: Epoxy resin Bisphenol A

## 2018-08-01 NOTE — HPI: TESTING (PATCH TESTING READING, DISCUSSION OF RESULTS)
How Severe Is Your Rash?: moderate
What Patch Testing Have You Undergone?: other
What Reading Is This?: 48 hour patch test reading

## 2018-08-02 ENCOUNTER — APPOINTMENT (OUTPATIENT)
Age: 53
Setting detail: DERMATOLOGY
End: 2018-08-02

## 2018-08-02 DIAGNOSIS — L70.0 ACNE VULGARIS: ICD-10-CM

## 2018-08-02 PROCEDURE — ? ACNE SURGERY

## 2018-08-02 PROCEDURE — 10040 EXTRACTION: CPT

## 2018-08-02 ASSESSMENT — LOCATION SIMPLE DESCRIPTION DERM: LOCATION SIMPLE: LEFT CHEEK

## 2018-08-02 ASSESSMENT — LOCATION DETAILED DESCRIPTION DERM: LOCATION DETAILED: LEFT CENTRAL MALAR CHEEK

## 2018-08-02 ASSESSMENT — LOCATION ZONE DERM: LOCATION ZONE: FACE

## 2018-08-02 NOTE — PROCEDURE: ACNE SURGERY
Render Post-Care Instructions In Note?: no
Acne Type: Comedonal Lesions
Consent was obtained and risks were reviewed.
Post-Care Instructions: I reviewed with the patient  post-care instructions.
Detail Level: Zone
Extraction Method: comedo extractor
Prep Text (Optional): Prior to removal the treatment areas were prepped in the usual fashion.

## 2018-08-03 ENCOUNTER — APPOINTMENT (OUTPATIENT)
Age: 53
Setting detail: DERMATOLOGY
End: 2018-08-03

## 2018-08-03 DIAGNOSIS — L259 CONTACT DERMATITIS AND OTHER ECZEMA, UNSPECIFIED CAUSE: ICD-10-CM

## 2018-08-03 PROBLEM — L70.0 ACNE VULGARIS: Status: ACTIVE | Noted: 2018-08-03

## 2018-08-03 PROBLEM — L23.9 ALLERGIC CONTACT DERMATITIS, UNSPECIFIED CAUSE: Status: ACTIVE | Noted: 2018-08-03

## 2018-08-03 PROCEDURE — ? COSMETIC PATCH TEST READING

## 2018-08-03 PROCEDURE — ? NORTH AMERICAN 80 PATCH TEST READING

## 2018-08-03 PROCEDURE — ? FRAGRANCE PATCH TEST READING

## 2018-08-03 PROCEDURE — ? ADDITIONAL NOTES

## 2018-08-03 PROCEDURE — 99213 OFFICE O/P EST LOW 20 MIN: CPT | Mod: 24

## 2018-08-03 NOTE — PROCEDURE: ADDITIONAL NOTES
Additional Notes: Patient informed she does not have any positives allergens.\\nPatient informed to start creating a diary when the rash appears and could be from stress.

## 2018-08-03 NOTE — PROCEDURE: COSMETIC PATCH TEST READING
Allergen 22 Reaction: no reaction
Name Of Allergen 5: Sorbitan oleate / (Sorbitan monooleate (Span 80))
Name Of Allergen 34: Benzyl alcohol
Name Of Allergen 23: Hydroabietyl alcohol / (Abitol)
Name Of Allergen 50: Peppermint oil
Name Of Allergen 45: Methyldibromo glutaronitrile (MDBGN)
Name Of Allergen 56: Oleamidopropyl dimethylamine
Name Of Allergen 48: Dimethylaminopropylamine
Name Of Allergen 41: 2-Phenoxyethanol
Name Of Allergen 30: Benzyl salicylate
Name Of Allergen 14: Imidazolidinyl urea Germall 115
Name Of Allergen 35: Methylisothiazolinone + Methylchloroisothiazolinone / (Cl+-Me-isothiazolinone (Su CG 200ppm))
Name Of Allergen 55: Musk mix
Name Of Allergen 1: Isopropyl myristate
Name Of Allergen 20: Hexahydro-1,3,5-darian-(2-hydroxyethyl)triazine(Clarisse CORTEZ)
Name Of Allergen 52: Tocopheryl acetate
Name Of Allergen 51: Shellac
Name Of Allergen 36: tert-Butylhydroquinone
Name Of Allergen 33: Cocamidopropylbetaine
Name Of Allergen 38: Propyl gallate
Name Of Allergen 44: DMDM Hydantoin
Name Of Allergen 13: Thimerosal (Merthiolate)
Name Of Allergen 18: Phenylmercuric acetate
Name Of Allergen 7: BHT / (2,2-Mk-vzph-butyl-4-cresol)
Name Of Allergen 21: Clioquinol (Chinoform, Vioform)
Name Of Allergen 11: p-Chloro-m-cresol / (4-Chloro-3-cresol ([facility]))
Name Of Allergen 43: Tocopherol/ (DL alpha tocopherol)
Name Of Allergen 19: Chloroacetamide / (2-Chloroacetamide)
Name Of Allergen 46: Tea Tree Oil
What Reading Time Point?: 96 hour
Name Of Allergen 31: 2-Bromo-2-nitropropane-l,3-diol (Bronopol)
Name Of Allergen 32: Sodium-2-pyridinethiol-1-oxide(Sodiumomadine)
Name Of Allergen 39: Dodecyl gallate
Name Of Allergen 54: Methylisothiazolinone
Name Of Allergen 9: Triclosan (Irgasan )
Name Of Allergen 6: 2-tert-Butyl-4-methoxyphenol (BHA)
Name Of Allergen 28: Stearyl alcohol
Name Of Allergen 3: Triethanolamine
Name Of Allergen 37: Drometrizole / (2(2-Hydroxy-5-methyl-phenyl)benzotriazol)
Name Of Allergen 24: Phenyl salicylate(Salol)
Name Of Allergen 8: Octyl gallate
Name Of Allergen 16: Chlorhexidine digluconate
Show Allergen Counseling In The Note?: Yes
Name Of Allergen 17: Paraben Mix
Name Of Allergen 15: Methenamine / (Hexamethylene tetramine)
Name Of Allergen 49: lauryl polyglucose
Number Of Patches Read: 57
Name Of Allergen 2: Amerchol L 101
Name Of Allergen 27: Propylene glycol
Name Of Allergen 29: Cetyl alcohol
Name Of Allergen 22: Ethylenediamine dihydrochloride
Name Of Allergen 25: Benzophenone-3 / (2-Hydroxy-4-methoxybenzophenone)
Detail Level: Zone
Name Of Allergen 10: Sorbic acid
Name Of Allergen 53: Turpentine Oil Oxidized
Name Of Allergen 26: Sorbitan sesquioleate
Name Of Allergen 12: Chloroxylenol (PCMX) / (4-Chloro-3,5-xylenol (PCMX))
Name Of Allergen 4: Polysorbate 80 / (Polyoxyethylenesorbitanmonooleate(Tween 80))
Name Of Allergen 42: Diazolidinylurea (Germall II)
Name Of Allergen 47: Iodopropynyl butyl carbamate
Name Of Allergen 40: Quaternium-15 (Dowicil 200) / (1-(3-Chloroallyl)-3,5,6-zziwnx-7-azoniaadamantane chloride)

## 2018-08-03 NOTE — PROCEDURE: NORTH AMERICAN 80 PATCH TEST READING
Name Of Allergen 13: Epoxy resin Bisphenol A
Allergen 58 Reaction: no reaction
Name Of Allergen 74: Benzalkonium chloride
Name Of Allergen 27: Methyldibromo glutaronitrile (MDBGN)
Name Of Allergen 53: Decyl Glucoside
Name Of Allergen 47: Triethanolamine
Name Of Allergen 51: Disperse Yellow 3
Name Of Allergen 18: Potassium dichromate
Name Of Allergen 7: Amerchol L 101
Name Of Allergen 35: Chloroxylenol (PCMX) / (4-Chloro-3,5-xylenol (PCMX))
Name Of Allergen 21: Diazolidinylurea (Germall II)
Name Of Allergen 82: EthylHexylGlycerin
Name Of Allergen 64: 2-n-Octyl-4-isothiazolin-3-one
Name Of Allergen 49: Tea Tree Oil
Name Of Allergen 55: HEMA (2-Hydroxyethyl methacrylate)
Name Of Allergen 26: Paraben Mix
Name Of Allergen 72: Hydroxyisohexyl 3-Cyclohexene Carboxaldehyde (Lyral)
Name Of Allergen 10: Thiuram mix
Name Of Allergen 71: Isoamyl-p-methoxycinnamate
Name Of Allergen 44: Tixocortol-21-pivalate
Name Of Allergen 76: Cocamidopropylbetaine
Name Of Allergen 42: Methyl methacrylate
Name Of Allergen 5: Imidazolidinyl urea Germall 115
Name Of Allergen 58: Benzyl alcohol
Name Of Allergen 22: Tocopherol/ (DL alpha tocopherol)
Name Of Allergen 38: Goldsodiumthiosulfate
Name Of Allergen 59: Isopropyl myristate
Name Of Allergen 4: p-Phenylenediamine (PPD)
Name Of Allergen 70: Benzoylperoxide
Name Of Allergen 23: Bacitracin
Name Of Allergen 14: Quaternium-15
Name Of Allergen 1: Benzocaine
Name Of Allergen 39: Ethyl acrylate
Name Of Allergen 60: Triclosan (Irgasan )
Name Of Allergen 24: Mixed dialkyl thiourea
Name Of Allergen 65: Disperse Blue 106/124 Mix
Name Of Allergen 12: Ethylenediamine dihydrochloride
Name Of Allergen 3: Colophonium / (Colophony)
Name Of Allergen 33: Propolis
Name Of Allergen 75: Amidoamine
Name Of Allergen 20: Nickelsulfate hexahydrate
Show Negative Results In The Note?: Yes
Name Of Allergen 57: Cananga odorata oil / (Ylang-Ylang oil)
Name Of Allergen 9: Neomycin sulfate
Name Of Allergen 83: Sodium Lauryl Sulfate
Name Of Allergen 73: Ethylhexyl (Octyl Salicylate)
Name Of Allergen 61: Desoximetasone
Name Of Allergen 63: Iodopropynyl butyl carbamate
Name Of Allergen 30: 2-Bromo-2-nitropropane-l,3-diol (Bronopol)
Name Of Allergen 69: Dibucaine hydrochloride
Name Of Allergen 17: N-Isopropyl-N-phenyl--4-phenylenediamine
Name Of Allergen 84: Lanolin Alcohol
Name Of Allergen 15: 1-qqeo-Rlkskubbhxgeyumfyjeettt resin
Name Of Allergen 43: Cobalt(II) chloride hexahydrate
Name Of Allergen 45: Budesonide
Name Of Allergen 8: Carba mix
Name Of Allergen 37: 2-tert-Butyl-4-methoxyphenol (BHA)
Name Of Allergen 48: Hydrocortisone-17-butyrate
Name Of Allergen 66: Compositae Mix II
Name Of Allergen 11: Clobetasol-17-propionate
Name Of Allergen 80: Dimethylol dihydroxyethyleneurea( Fix.CPN)
Name Of Allergen 25: Disperse Orange 3
Name Of Allergen 52: Benzyl salicylate
Name Of Allergen 19: Myroxylon pereirae resin / (Balsam [place])
Name Of Allergen 31: Sesquiterpene lactone mix
Name Of Allergen 2: 2-Mercaptobenzothiazole (MBT)
Name Of Allergen 50: Fragrance Mix II
Name Of Allergen 77: Formaldehyde
Name Of Allergen 34: Benzophenone-3 / (2-Hydroxy-4-methoxybenzophenone)
Name Of Allergen 46: Cocamide DARSHAN / (Coconut diethanolamide)
Name Of Allergen 54: Methylisothiazolinone
Detail Level: Zone
Name Of Allergen 56: DMDM Hydantoin
Name Of Allergen 28: Fragrance mix
What Reading Time Point?: 96 hour
Name Of Allergen 41: Toluenesulfonamide formaldehyde resin
Name Of Allergen 67: Lidocaine
Name Of Allergen 68: Fusidic acid sodium salt
Name Of Allergen 32: Thimerosal (Merthiolate)
Name Of Allergen 29: Glutaral / (Glutaraldehyde)
Name Of Allergen 62: Polysorbate 80 / Polyoxyethylenesorbitanmonooleate
Name Of Allergen 16: Mercapto mix
Name Of Allergen 81: EDTA (Disodium)
Number Of Patches Read: 84
Name Of Allergen 78: Methylisothiazolinone + Methylchloroisothiazolinone
Name Of Allergen 40: Glyceryl monothioglycolate (GMTG)
Name Of Allergen 36: Ethyleneurea, melamine formaldehyde mix
Name Of Allergen 79: Propylene glycol
Name Of Allergen 6: Cinnamal / (Cinnamic aldehyde)

## 2018-08-03 NOTE — HPI: TESTING (PATCH TESTING READING, DISCUSSION OF RESULTS)
How Severe Is Your Rash?: moderate
What Patch Testing Have You Undergone?: other
What Reading Is This?: 96 hour patch test reading

## 2018-08-03 NOTE — PROCEDURE: FRAGRANCE PATCH TEST READING
Allergen 14 Reaction: no reaction
Name Of Allergen 43: Hydroperoxides of Linalool
Name Of Allergen 2: Cinnamyl alcohol / (Cinnamic alcohol)
Name Of Allergen 23: Dayan absolute
Name Of Allergen 33: Anise alcohol
Name Of Allergen 37: Tomas armstronga
Name Of Allergen 11: Methylanthranilate
Number Of Patches Read: 46
Name Of Allergen 41: Methyl-2-octynoate
Name Of Allergen 27: Farnesol/(2,6,10-Dodecatrien-1-ol,3,7,11 trimethyl)
Name Of Allergen 1: Cinnamal / (Cinnamic aldehyde)
Name Of Allergen 13: Musk ketone
Name Of Allergen 25: Hydroxyisohexyl 3-Cyclohexene Carboxaldehyde (Lyral)
Name Of Allergen 17: Vanillin
Name Of Allergen 45: Perfume mix
Name Of Allergen 35: Benzyl cinnamate
Name Of Allergen 10: Musk xylene
What Reading Time Point?: 96 hour
Name Of Allergen 44: Hydroperoxides of Limonene
Name Of Allergen 38: alpha-Isomethyl ionone
Name Of Allergen 9: Narcissus Poeticus / (Narcissus absolute)
Show Allergen Counseling In The Note?: No
Name Of Allergen 6: Geraniol
Name Of Allergen 29: Hexyl cinnamic aldehyde
Name Of Allergen 21: Cananga odorata oil / (Ylang-Ylang oil)
Name Of Allergen 8: Hydroxycitronellal
Show Negative Results In The Note?: Yes
Name Of Allergen 16: Benzyl alcohol
Detail Level: Zone
Name Of Allergen 5: Isoeugenol
Name Of Allergen 40: Linalool, synthetic
Name Of Allergen 4: Eugenol
Name Of Allergen 7: Melania absolute
Name Of Allergen 32: Amyl cinnamyl alcohol
Name Of Allergen 39: D-Limonene
Name Of Allergen 3: Amyl cinnamal / (Amylcinnamaldehyde)
Name Of Allergen 18: Lavender absolute/(Lavandula)
Name Of Allergen 31: Fragrance Mix II
Name Of Allergen 42: [place]/ (2,2-dimethyl-3-(3-pethylphenyl)propan1-ol)
Name Of Allergen 28: Citronellol
Name Of Allergen 20: Diana oil
Name Of Allergen 15: Benzyl salicylate
Name Of Allergen 22: Geranium oil Elizabeth
Name Of Allergen 12: Musk moskene
Name Of Allergen 19: Cananga oil
Name Of Allergen 26: Citral
Name Of Allergen 36: Butylphenyl methylpropional (Teri)
Name Of Allergen 30: Coumarin
Name Of Allergen 34: Benzyl benzoate
Name Of Allergen 24: Santalum album oil / (Sandalwood oil (Macanese)
Name Of Allergen 14: Dayan synthetic

## 2018-11-06 ENCOUNTER — APPOINTMENT (OUTPATIENT)
Age: 53
Setting detail: DERMATOLOGY
End: 2018-11-06

## 2018-11-06 DIAGNOSIS — L70.0 ACNE VULGARIS: ICD-10-CM

## 2018-11-06 PROCEDURE — ? ACNE SURGERY

## 2018-11-06 PROCEDURE — 10040 EXTRACTION: CPT

## 2018-11-06 ASSESSMENT — LOCATION DETAILED DESCRIPTION DERM: LOCATION DETAILED: LEFT INFERIOR CENTRAL MALAR CHEEK

## 2018-11-06 ASSESSMENT — LOCATION ZONE DERM: LOCATION ZONE: FACE

## 2018-11-06 ASSESSMENT — LOCATION SIMPLE DESCRIPTION DERM: LOCATION SIMPLE: LEFT CHEEK

## 2018-11-06 NOTE — PROCEDURE: ACNE SURGERY
Prep Text (Optional): Prior to removal the treatment areas were prepped in the usual fashion.
Post-Care Instructions: I reviewed with the patient  post-care instructions.
Extraction Method: comedo extractor
Render Number Of Lesions Treated: no
Acne Type: Comedonal Lesions
Detail Level: Zone
Consent was obtained and risks were reviewed.

## 2023-01-30 NOTE — PROCEDURE: MIPS QUALITY
Quality 111:Pneumonia Vaccination Status For Older Adults: Pneumococcal Vaccination not Administered or Previously Received, Reason not Otherwise Specified
Quality 110: Preventive Care And Screening: Influenza Immunization: Influenza Immunization Ordered or Recommended, but not Administered due to system reason
Detail Level: Detailed
no

## 2023-07-17 ENCOUNTER — APPOINTMENT (RX ONLY)
Dept: URBAN - METROPOLITAN AREA CLINIC 346 | Facility: CLINIC | Age: 58
Setting detail: DERMATOLOGY
End: 2023-07-17

## 2023-07-17 DIAGNOSIS — L21.8 OTHER SEBORRHEIC DERMATITIS: ICD-10-CM

## 2023-07-17 DIAGNOSIS — D18.0 HEMANGIOMA: ICD-10-CM

## 2023-07-17 DIAGNOSIS — Z71.89 OTHER SPECIFIED COUNSELING: ICD-10-CM

## 2023-07-17 DIAGNOSIS — L82.1 OTHER SEBORRHEIC KERATOSIS: ICD-10-CM

## 2023-07-17 DIAGNOSIS — L81.4 OTHER MELANIN HYPERPIGMENTATION: ICD-10-CM

## 2023-07-17 DIAGNOSIS — L70.0 ACNE VULGARIS: ICD-10-CM | Status: INADEQUATELY CONTROLLED

## 2023-07-17 DIAGNOSIS — D22 MELANOCYTIC NEVI: ICD-10-CM

## 2023-07-17 PROBLEM — D22.5 MELANOCYTIC NEVI OF TRUNK: Status: ACTIVE | Noted: 2023-07-17

## 2023-07-17 PROBLEM — D18.01 HEMANGIOMA OF SKIN AND SUBCUTANEOUS TISSUE: Status: ACTIVE | Noted: 2023-07-17

## 2023-07-17 PROCEDURE — 99204 OFFICE O/P NEW MOD 45 MIN: CPT

## 2023-07-17 PROCEDURE — ? COUNSELING

## 2023-07-17 PROCEDURE — ? PRESCRIPTION MEDICATION MANAGEMENT

## 2023-07-17 PROCEDURE — ? OTC TREATMENT REGIMEN

## 2023-07-17 PROCEDURE — ? PRESCRIPTION

## 2023-07-17 RX ORDER — CLASCOTERONE 1 G/100G
CREAM TOPICAL
Qty: 60 | Refills: 4 | Status: ERX | COMMUNITY
Start: 2023-07-17

## 2023-07-17 RX ORDER — TAZAROTENE 0.45 MG/G
LOTION TOPICAL
Qty: 45 | Refills: 4 | Status: ERX | COMMUNITY
Start: 2023-07-17

## 2023-07-17 RX ADMIN — CLASCOTERONE: 1 CREAM TOPICAL at 00:00

## 2023-07-17 RX ADMIN — TAZAROTENE: 0.45 LOTION TOPICAL at 00:00

## 2023-07-17 ASSESSMENT — LOCATION DETAILED DESCRIPTION DERM
LOCATION DETAILED: SUPERIOR LUMBAR SPINE
LOCATION DETAILED: RIGHT INFERIOR CENTRAL MALAR CHEEK
LOCATION DETAILED: LEFT INFERIOR MEDIAL MIDBACK
LOCATION DETAILED: LEFT CENTRAL MALAR CHEEK

## 2023-07-17 ASSESSMENT — LOCATION ZONE DERM
LOCATION ZONE: FACE
LOCATION ZONE: TRUNK

## 2023-07-17 ASSESSMENT — LOCATION SIMPLE DESCRIPTION DERM
LOCATION SIMPLE: LEFT CHEEK
LOCATION SIMPLE: LEFT LOWER BACK
LOCATION SIMPLE: RIGHT CHEEK
LOCATION SIMPLE: LOWER BACK

## 2023-07-17 ASSESSMENT — SEVERITY ASSESSMENT OVERALL AMONG ALL PATIENTS
IN YOUR EXPERIENCE, AMONG ALL PATIENTS YOU HAVE SEEN WITH THIS CONDITION, HOW SEVERE IS THIS PATIENT'S CONDITION?: MULTIPLE INFLAMMATORY LESIONS BUT NONINFLAMMATORY LESIONS PREDOMINATE

## 2023-07-17 NOTE — PROCEDURE: PRESCRIPTION MEDICATION MANAGEMENT
Initiate Treatment: AM:\\nWash face with gentle cleanser\\nApply Winlevi to entire face\\nApply moisturizer w/SPF\\n\\nPM:\\nWash face with gentle cleanser\\nApply small pea sized amount of Arazlo to entire face 3x/week, increase to nightly as tolerated\\nApply Winlevi to entire face\\nApply moisturizer as needed
Render In Strict Bullet Format?: No
Detail Level: Zone

## 2023-07-17 NOTE — PROCEDURE: OTC TREATMENT REGIMEN
Patient Specific Otc Recommendations (Will Not Stick From Patient To Patient): Discontinue blow drying
Detail Level: Zone

## 2023-07-17 NOTE — HPI: EVALUATION OF SKIN LESION(S)
What Type Of Note Output Would You Prefer (Optional)?: Standard Output
Hpi Title: Evaluation of Skin Lesions
How Severe Are Your Spot(S)?: mild
Have Your Spot(S) Been Treated In The Past?: has not been treated
Additional History: Dry patch on scalp and brown spot on right leg

## 2023-07-17 NOTE — PROCEDURE: COUNSELING
Detail Level: Detailed
Detail Level: Generalized
Tetracycline Counseling: Patient counseled regarding possible photosensitivity and increased risk for sunburn.  Patient instructed to avoid sunlight, if possible.  When exposed to sunlight, patients should wear protective clothing, sunglasses, and sunscreen.  The patient was instructed to call the office immediately if the following severe adverse effects occur:  hearing changes, easy bruising/bleeding, severe headache, or vision changes.  The patient verbalized understanding of the proper use and possible adverse effects of tetracycline.  All of the patient's questions and concerns were addressed. Patient understands to avoid pregnancy while on therapy due to potential birth defects.
Topical Retinoid counseling:  Patient advised to apply a pea-sized amount only at bedtime and wait 30 minutes after washing their face before applying.  If too drying, patient may add a non-comedogenic moisturizer. The patient verbalized understanding of the proper use and possible adverse effects of retinoids.  All of the patient's questions and concerns were addressed.
Topical Sulfur Applications Counseling: Topical Sulfur Counseling: Patient counseled that this medication may cause skin irritation or allergic reactions.  In the event of skin irritation, the patient was advised to reduce the amount of the drug applied or use it less frequently.   The patient verbalized understanding of the proper use and possible adverse effects of topical sulfur application.  All of the patient's questions and concerns were addressed.
Detail Level: Zone
Topical Clindamycin Pregnancy And Lactation Text: This medication is Pregnancy Category B and is considered safe during pregnancy. It is unknown if it is excreted in breast milk.
Minocycline Counseling: Patient advised regarding possible photosensitivity and discoloration of the teeth, skin, lips, tongue and gums.  Patient instructed to avoid sunlight, if possible.  When exposed to sunlight, patients should wear protective clothing, sunglasses, and sunscreen.  The patient was instructed to call the office immediately if the following severe adverse effects occur:  hearing changes, easy bruising/bleeding, severe headache, or vision changes.  The patient verbalized understanding of the proper use and possible adverse effects of minocycline.  All of the patient's questions and concerns were addressed.
Benzoyl Peroxide Counseling: Patient counseled that medicine may cause skin irritation and bleach clothing.  In the event of skin irritation, the patient was advised to reduce the amount of the drug applied or use it less frequently.   The patient verbalized understanding of the proper use and possible adverse effects of benzoyl peroxide.  All of the patient's questions and concerns were addressed.
High Dose Vitamin A Counseling: Side effects reviewed, pt to contact office should one occur.
Erythromycin Pregnancy And Lactation Text: This medication is Pregnancy Category B and is considered safe during pregnancy. It is also excreted in breast milk.
Isotretinoin Pregnancy And Lactation Text: This medication is Pregnancy Category X and is considered extremely dangerous during pregnancy. It is unknown if it is excreted in breast milk.
Sarecycline Counseling: Patient advised regarding possible photosensitivity and discoloration of the teeth, skin, lips, tongue and gums.  Patient instructed to avoid sunlight, if possible.  When exposed to sunlight, patients should wear protective clothing, sunglasses, and sunscreen.  The patient was instructed to call the office immediately if the following severe adverse effects occur:  hearing changes, easy bruising/bleeding, severe headache, or vision changes.  The patient verbalized understanding of the proper use and possible adverse effects of sarecycline.  All of the patient's questions and concerns were addressed.
Aklief Pregnancy And Lactation Text: It is unknown if this medication is safe to use during pregnancy.  It is unknown if this medication is excreted in breast milk.  Breastfeeding women should use the topical cream on the smallest area of the skin for the shortest time needed while breastfeeding.  Do not apply to nipple and areola.
Azelaic Acid Counseling: Patient counseled that medicine may cause skin irritation and to avoid applying near the eyes.  In the event of skin irritation, the patient was advised to reduce the amount of the drug applied or use it less frequently.   The patient verbalized understanding of the proper use and possible adverse effects of azelaic acid.  All of the patient's questions and concerns were addressed.
Spironolactone Pregnancy And Lactation Text: This medication can cause feminization of the male fetus and should be avoided during pregnancy. The active metabolite is also found in breast milk.
Dapsone Pregnancy And Lactation Text: This medication is Pregnancy Category C and is not considered safe during pregnancy or breast feeding.
High Dose Vitamin A Pregnancy And Lactation Text: High dose vitamin A therapy is contraindicated during pregnancy and breast feeding.
Erythromycin Counseling:  I discussed with the patient the risks of erythromycin including but not limited to GI upset, allergic reaction, drug rash, diarrhea, increase in liver enzymes, and yeast infections.
Tetracycline Pregnancy And Lactation Text: This medication is Pregnancy Category D and not consider safe during pregnancy. It is also excreted in breast milk.
Dapsone Counseling: I discussed with the patient the risks of dapsone including but not limited to hemolytic anemia, agranulocytosis, rashes, methemoglobinemia, kidney failure, peripheral neuropathy, headaches, GI upset, and liver toxicity.  Patients who start dapsone require monitoring including baseline LFTs and weekly CBCs for the first month, then every month thereafter.  The patient verbalized understanding of the proper use and possible adverse effects of dapsone.  All of the patient's questions and concerns were addressed.
Spironolactone Counseling: Patient advised regarding risks of diarrhea, abdominal pain, hyperkalemia, birth defects (for female patients), liver toxicity and renal toxicity. The patient may need blood work to monitor liver and kidney function and potassium levels while on therapy. The patient verbalized understanding of the proper use and possible adverse effects of spironolactone.  All of the patient's questions and concerns were addressed.
Winlevi Counseling:  I discussed with the patient the risks of topical clascoterone including but not limited to erythema, scaling, itching, and stinging. Patient voiced their understanding.
Isotretinoin Counseling: Patient should get monthly blood tests, not donate blood, not drive at night if vision affected, not share medication, and not undergo elective surgery for 6 months after tx completed. Side effects reviewed, pt to contact office should one occur.
Topical Retinoid Pregnancy And Lactation Text: This medication is Pregnancy Category C. It is unknown if this medication is excreted in breast milk.
Bactrim Pregnancy And Lactation Text: This medication is Pregnancy Category D and is known to cause fetal risk.  It is also excreted in breast milk.
Birth Control Pills Counseling: Birth Control Pill Counseling: I discussed with the patient the potential side effects of OCPs including but not limited to increased risk of stroke, heart attack, thrombophlebitis, deep venous thrombosis, hepatic adenomas, breast changes, GI upset, headaches, and depression.  The patient verbalized understanding of the proper use and possible adverse effects of OCPs. All of the patient's questions and concerns were addressed.
Tazorac Counseling:  Patient advised that medication is irritating and drying.  Patient may need to apply sparingly and wash off after an hour before eventually leaving it on overnight.  The patient verbalized understanding of the proper use and possible adverse effects of tazorac.  All of the patient's questions and concerns were addressed.
Tazorac Pregnancy And Lactation Text: This medication is not safe during pregnancy. It is unknown if this medication is excreted in breast milk.
Bactrim Counseling:  I discussed with the patient the risks of sulfa antibiotics including but not limited to GI upset, allergic reaction, drug rash, diarrhea, dizziness, photosensitivity, and yeast infections.  Rarely, more serious reactions can occur including but not limited to aplastic anemia, agranulocytosis, methemoglobinemia, blood dyscrasias, liver or kidney failure, lung infiltrates or desquamative/blistering drug rashes.
Include Pregnancy/Lactation Warning?: No
Winlevi Pregnancy And Lactation Text: This medication is considered safe during pregnancy and breastfeeding.
Doxycycline Counseling:  Patient counseled regarding possible photosensitivity and increased risk for sunburn.  Patient instructed to avoid sunlight, if possible.  When exposed to sunlight, patients should wear protective clothing, sunglasses, and sunscreen.  The patient was instructed to call the office immediately if the following severe adverse effects occur:  hearing changes, easy bruising/bleeding, severe headache, or vision changes.  The patient verbalized understanding of the proper use and possible adverse effects of doxycycline.  All of the patient's questions and concerns were addressed.
Topical Clindamycin Counseling: Patient counseled that this medication may cause skin irritation or allergic reactions.  In the event of skin irritation, the patient was advised to reduce the amount of the drug applied or use it less frequently.   The patient verbalized understanding of the proper use and possible adverse effects of clindamycin.  All of the patient's questions and concerns were addressed.
Topical Sulfur Applications Pregnancy And Lactation Text: This medication is Pregnancy Category C and has an unknown safety profile during pregnancy. It is unknown if this topical medication is excreted in breast milk.
Doxycycline Pregnancy And Lactation Text: This medication is Pregnancy Category D and not consider safe during pregnancy. It is also excreted in breast milk but is considered safe for shorter treatment courses.
Benzoyl Peroxide Pregnancy And Lactation Text: This medication is Pregnancy Category C. It is unknown if benzoyl peroxide is excreted in breast milk.
Aklief counseling:  Patient advised to apply a pea-sized amount only at bedtime and wait 30 minutes after washing their face before applying.  If too drying, patient may add a non-comedogenic moisturizer.  The most commonly reported side effects including irritation, redness, scaling, dryness, stinging, burning, itching, and increased risk of sunburn.  The patient verbalized understanding of the proper use and possible adverse effects of retinoids.  All of the patient's questions and concerns were addressed.
Birth Control Pills Pregnancy And Lactation Text: This medication should be avoided if pregnant and for the first 30 days post-partum.
Azithromycin Pregnancy And Lactation Text: This medication is considered safe during pregnancy and is also secreted in breast milk.
Azelaic Acid Pregnancy And Lactation Text: This medication is considered safe during pregnancy and breast feeding.
Azithromycin Counseling:  I discussed with the patient the risks of azithromycin including but not limited to GI upset, allergic reaction, drug rash, diarrhea, and yeast infections.